# Patient Record
Sex: MALE | Race: WHITE
[De-identification: names, ages, dates, MRNs, and addresses within clinical notes are randomized per-mention and may not be internally consistent; named-entity substitution may affect disease eponyms.]

---

## 2018-02-02 ENCOUNTER — HOSPITAL ENCOUNTER (EMERGENCY)
Dept: HOSPITAL 80 - FED | Age: 28
Discharge: HOME | End: 2018-02-02
Payer: SELF-PAY

## 2018-02-02 VITALS
HEART RATE: 105 BPM | SYSTOLIC BLOOD PRESSURE: 216 MMHG | DIASTOLIC BLOOD PRESSURE: 191 MMHG | RESPIRATION RATE: 18 BRPM | OXYGEN SATURATION: 94 % | TEMPERATURE: 97.5 F
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
## 2018-02-02 DIAGNOSIS — F17.200: ICD-10-CM
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
## 2018-02-02 DIAGNOSIS — S69.91XA: Primary | ICD-10-CM

## 2018-02-02 PROCEDURE — L3807 WHFO W/O JOINTS PRE CST: HCPCS
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
